# Patient Record
Sex: FEMALE | Race: BLACK OR AFRICAN AMERICAN | NOT HISPANIC OR LATINO | URBAN - METROPOLITAN AREA
[De-identification: names, ages, dates, MRNs, and addresses within clinical notes are randomized per-mention and may not be internally consistent; named-entity substitution may affect disease eponyms.]

---

## 2019-04-13 ENCOUNTER — EMERGENCY (EMERGENCY)
Facility: HOSPITAL | Age: 34
LOS: 1 days | Discharge: HOME | End: 2019-04-13
Attending: EMERGENCY MEDICINE | Admitting: EMERGENCY MEDICINE
Payer: MEDICAID

## 2019-04-13 VITALS
HEART RATE: 100 BPM | RESPIRATION RATE: 18 BRPM | DIASTOLIC BLOOD PRESSURE: 66 MMHG | TEMPERATURE: 99 F | SYSTOLIC BLOOD PRESSURE: 111 MMHG | OXYGEN SATURATION: 100 %

## 2019-04-13 LAB
ACANTHOCYTES BLD QL SMEAR: SLIGHT — SIGNIFICANT CHANGE UP
ALBUMIN SERPL ELPH-MCNC: 5.1 G/DL — HIGH (ref 3.3–5)
ALP SERPL-CCNC: 60 U/L — SIGNIFICANT CHANGE UP (ref 40–120)
ALT FLD-CCNC: 44 U/L — HIGH (ref 4–33)
ANION GAP SERPL CALC-SCNC: 15 MMO/L — HIGH (ref 7–14)
ANISOCYTOSIS BLD QL: SIGNIFICANT CHANGE UP
AST SERPL-CCNC: 128 U/L — HIGH (ref 4–32)
BASE EXCESS BLDV CALC-SCNC: 1.4 MMOL/L — SIGNIFICANT CHANGE UP
BASOPHILS # BLD AUTO: 0.01 K/UL — SIGNIFICANT CHANGE UP (ref 0–0.2)
BASOPHILS NFR BLD AUTO: 0.3 % — SIGNIFICANT CHANGE UP (ref 0–2)
BASOPHILS NFR SPEC: 0 % — SIGNIFICANT CHANGE UP (ref 0–2)
BILIRUB SERPL-MCNC: 1.4 MG/DL — HIGH (ref 0.2–1.2)
BLASTS # FLD: 0 % — SIGNIFICANT CHANGE UP (ref 0–0)
BLD GP AB SCN SERPL QL: NEGATIVE — SIGNIFICANT CHANGE UP
BLOOD GAS VENOUS - CREATININE: 0.68 MG/DL — SIGNIFICANT CHANGE UP (ref 0.5–1.3)
BUN SERPL-MCNC: 9 MG/DL — SIGNIFICANT CHANGE UP (ref 7–23)
CALCIUM SERPL-MCNC: 9.6 MG/DL — SIGNIFICANT CHANGE UP (ref 8.4–10.5)
CHLORIDE BLDV-SCNC: 100 MMOL/L — SIGNIFICANT CHANGE UP (ref 96–108)
CHLORIDE SERPL-SCNC: 99 MMOL/L — SIGNIFICANT CHANGE UP (ref 98–107)
CO2 SERPL-SCNC: 22 MMOL/L — SIGNIFICANT CHANGE UP (ref 22–31)
CREAT SERPL-MCNC: 0.66 MG/DL — SIGNIFICANT CHANGE UP (ref 0.5–1.3)
DACRYOCYTES BLD QL SMEAR: SLIGHT — SIGNIFICANT CHANGE UP
ELLIPTOCYTES BLD QL SMEAR: SLIGHT — SIGNIFICANT CHANGE UP
EOSINOPHIL # BLD AUTO: 0.07 K/UL — SIGNIFICANT CHANGE UP (ref 0–0.5)
EOSINOPHIL NFR BLD AUTO: 1.9 % — SIGNIFICANT CHANGE UP (ref 0–6)
EOSINOPHIL NFR FLD: 0.9 % — SIGNIFICANT CHANGE UP (ref 0–6)
GAS PNL BLDV: 138 MMOL/L — SIGNIFICANT CHANGE UP (ref 136–146)
GIANT PLATELETS BLD QL SMEAR: PRESENT — SIGNIFICANT CHANGE UP
GLUCOSE BLDV-MCNC: 97 — SIGNIFICANT CHANGE UP (ref 70–99)
GLUCOSE SERPL-MCNC: 102 MG/DL — HIGH (ref 70–99)
HCG SERPL-ACNC: < 5 MIU/ML — SIGNIFICANT CHANGE UP
HCO3 BLDV-SCNC: 25 MMOL/L — SIGNIFICANT CHANGE UP (ref 20–27)
HCT VFR BLD CALC: 15.6 % — CRITICAL LOW (ref 34.5–45)
HCT VFR BLDV CALC: 16.7 % — CRITICAL LOW (ref 34.5–45)
HGB BLD-MCNC: 5.2 G/DL — CRITICAL LOW (ref 11.5–15.5)
HGB BLDV-MCNC: 5.3 G/DL — CRITICAL LOW (ref 11.5–15.5)
HYPOCHROMIA BLD QL: SLIGHT — SIGNIFICANT CHANGE UP
IMM GRANULOCYTES NFR BLD AUTO: 2.5 % — HIGH (ref 0–1.5)
LACTATE BLDV-MCNC: 1 MMOL/L — SIGNIFICANT CHANGE UP (ref 0.5–2)
LYMPHOCYTES # BLD AUTO: 1.65 K/UL — SIGNIFICANT CHANGE UP (ref 1–3.3)
LYMPHOCYTES # BLD AUTO: 45.3 % — HIGH (ref 13–44)
LYMPHOCYTES NFR SPEC AUTO: 53.5 % — HIGH (ref 13–44)
MACROCYTES BLD QL: SLIGHT — SIGNIFICANT CHANGE UP
MCHC RBC-ENTMCNC: 30.8 PG — SIGNIFICANT CHANGE UP (ref 27–34)
MCHC RBC-ENTMCNC: 33.3 % — SIGNIFICANT CHANGE UP (ref 32–36)
MCV RBC AUTO: 92.3 FL — SIGNIFICANT CHANGE UP (ref 80–100)
METAMYELOCYTES # FLD: 0 % — SIGNIFICANT CHANGE UP (ref 0–1)
MICROCYTES BLD QL: SLIGHT — SIGNIFICANT CHANGE UP
MONOCYTES # BLD AUTO: 0.26 K/UL — SIGNIFICANT CHANGE UP (ref 0–0.9)
MONOCYTES NFR BLD AUTO: 7.1 % — SIGNIFICANT CHANGE UP (ref 2–14)
MONOCYTES NFR BLD: 1.7 % — LOW (ref 2–9)
MYELOCYTES NFR BLD: 1.8 % — HIGH (ref 0–0)
NEUTROPHIL AB SER-ACNC: 39.5 % — LOW (ref 43–77)
NEUTROPHILS # BLD AUTO: 1.56 K/UL — LOW (ref 1.8–7.4)
NEUTROPHILS NFR BLD AUTO: 42.9 % — LOW (ref 43–77)
NEUTS BAND # BLD: 0 % — SIGNIFICANT CHANGE UP (ref 0–6)
NRBC # BLD: 1 /100WBC — SIGNIFICANT CHANGE UP
NRBC # FLD: 0.13 K/UL — SIGNIFICANT CHANGE UP (ref 0–0)
NRBC FLD-RTO: 3.6 — SIGNIFICANT CHANGE UP
OB PNL STL: NEGATIVE — SIGNIFICANT CHANGE UP
OTHER - HEMATOLOGY %: 0 — SIGNIFICANT CHANGE UP
OVALOCYTES BLD QL SMEAR: SIGNIFICANT CHANGE UP
PCO2 BLDV: 41 MMHG — SIGNIFICANT CHANGE UP (ref 41–51)
PH BLDV: 7.41 PH — SIGNIFICANT CHANGE UP (ref 7.32–7.43)
PLATELET # BLD AUTO: 99 K/UL — LOW (ref 150–400)
PLATELET COUNT - ESTIMATE: SIGNIFICANT CHANGE UP
PMV BLD: SIGNIFICANT CHANGE UP FL (ref 7–13)
PO2 BLDV: 24 MMHG — LOW (ref 35–40)
POIKILOCYTOSIS BLD QL AUTO: SIGNIFICANT CHANGE UP
POLYCHROMASIA BLD QL SMEAR: SLIGHT — SIGNIFICANT CHANGE UP
POTASSIUM BLDV-SCNC: 3.5 MMOL/L — SIGNIFICANT CHANGE UP (ref 3.4–4.5)
POTASSIUM SERPL-MCNC: 3.7 MMOL/L — SIGNIFICANT CHANGE UP (ref 3.5–5.3)
POTASSIUM SERPL-SCNC: 3.7 MMOL/L — SIGNIFICANT CHANGE UP (ref 3.5–5.3)
PROMYELOCYTES # FLD: 0 % — SIGNIFICANT CHANGE UP (ref 0–0)
PROT SERPL-MCNC: 7.5 G/DL — SIGNIFICANT CHANGE UP (ref 6–8.3)
RBC # BLD: 1.69 M/UL — LOW (ref 3.8–5.2)
RBC # FLD: 25 % — HIGH (ref 10.3–14.5)
RH IG SCN BLD-IMP: POSITIVE — SIGNIFICANT CHANGE UP
RH IG SCN BLD-IMP: POSITIVE — SIGNIFICANT CHANGE UP
SAO2 % BLDV: 27.8 % — LOW (ref 60–85)
SCHISTOCYTES BLD QL AUTO: SLIGHT — SIGNIFICANT CHANGE UP
SODIUM SERPL-SCNC: 136 MMOL/L — SIGNIFICANT CHANGE UP (ref 135–145)
TSH SERPL-MCNC: 1.24 UIU/ML — SIGNIFICANT CHANGE UP (ref 0.27–4.2)
VARIANT LYMPHS # BLD: 2.6 % — SIGNIFICANT CHANGE UP
WBC # BLD: 3.64 K/UL — LOW (ref 3.8–10.5)
WBC # FLD AUTO: 3.64 K/UL — LOW (ref 3.8–10.5)

## 2019-04-13 PROCEDURE — 93010 ELECTROCARDIOGRAM REPORT: CPT | Mod: 59

## 2019-04-13 PROCEDURE — 71046 X-RAY EXAM CHEST 2 VIEWS: CPT | Mod: 26

## 2019-04-13 PROCEDURE — 99218: CPT

## 2019-04-13 RX ORDER — SODIUM CHLORIDE 9 MG/ML
1000 INJECTION INTRAMUSCULAR; INTRAVENOUS; SUBCUTANEOUS ONCE
Qty: 0 | Refills: 0 | Status: COMPLETED | OUTPATIENT
Start: 2019-04-13 | End: 2019-04-14

## 2019-04-13 NOTE — ED PROVIDER NOTE - CLINICAL SUMMARY MEDICAL DECISION MAKING FREE TEXT BOX
33F with dyspnea on exertion and palpitations, r/o symptomatic anemia, thyrotoxicosis, arrhythmia. check labs, tsh

## 2019-04-13 NOTE — ED CDU PROVIDER INITIAL DAY NOTE - ATTENDING CONTRIBUTION TO CARE
I performed a history and physical exam of the patient and discussed their management with the advanced care provider. I reviewed the advanced care provider's note and agree with the documented findings and plan of care. 32 y/o female with hx of anemia p/w GREENE, weakness, and palpitations worse this week, pt visiting from [facility] no CP, no fever, no abdominal pain, no BRBPR, no melana, no dysmenorrhea, found to have low h/h, needs to be admitted to CDU for further monitoring, transfusion,

## 2019-04-13 NOTE — ED ADULT NURSE NOTE - OBJECTIVE STATEMENT
33 y female A+OX3 presents to the ER with the complaint of palpitations. Pt is visiting from [facility] and states that for the past few days, she has been experiencing intermittent palpitations. Denies any CP or sob. Pt states she has a hx of anemia and hyperthyroidism. States she has not been on her thyroid meds for 2 months as she was told her levels were normal and patient states she has never needed a blood transfusion in the past. Pt denies any dizziness or nausea. Pt appears pale in complexion but unknown if this is the patient's regular color. 20 g iv placed on right ac, labs drawn and sent. Will continue to monitor.

## 2019-04-13 NOTE — ED CDU PROVIDER INITIAL DAY NOTE - OBJECTIVE STATEMENT
Pt is 34 y/o female, Creole speaking (translation services offered but pt declined and wished that family translates) with Pmhx of anemia and hyperthyroidism here for eval of generalized weakness, sob and palpations x 4 days. Pt admits that she has been feeling weak  and tired "for months", has been off her medications for "quite a while", started on iron for possible TAMAR by PMD who is working pt up for anemia in [facility]. Pt denies any CP, syncopal episodes, denies dizziness, denies heavy vaginal bleeds, melena (guaiac negative), states that her Hb was around 8 last time it was checked but pt doesn't recall when was it. LMP - 4/1, ucg negative. In cdu for 2 units of PRBCs

## 2019-04-13 NOTE — ED ADULT TRIAGE NOTE - CHIEF COMPLAINT QUOTE
Pt c/o SOB and palpitations x 3 days, along with dizziness and weakness, appears pale and lethargic. Pt with PMH of hyperthyroid and anemia (not on iron supplements, no hx of blood transfusions), denies recent bleeding.

## 2019-04-13 NOTE — ED ADULT NURSE NOTE - NSIMPLEMENTINTERV_GEN_ALL_ED
Implemented All Universal Safety Interventions:  Advance to call system. Call bell, personal items and telephone within reach. Instruct patient to call for assistance. Room bathroom lighting operational. Non-slip footwear when patient is off stretcher. Physically safe environment: no spills, clutter or unnecessary equipment. Stretcher in lowest position, wheels locked, appropriate side rails in place.

## 2019-04-13 NOTE — ED PROVIDER NOTE - ATTENDING CONTRIBUTION TO CARE
I performed a face to face history and physical exam of the patient and discussed their management with the resident. I reviewed the resident's note and agree with the documented findings and plan of care. 34 y/o female with hx of anemia p/w GREENE, weakness, and palpitations worse this week, pt visiting from [facility] no CP, no fever, no abdominal pain, no BRBPR, no melana, no dysmenorrhea, 1)tele 2)EKG 3)CBC, CMP, VBG, CE, TSH, 3)Reevaluate

## 2019-04-13 NOTE — ED PROVIDER NOTE - OBJECTIVE STATEMENT
33F with hx of anemia and hyperthyroidism (off medications for a few months, visiting from [facility] for 6 more weeks) p/w palpitations and GREENE for the past 3 days. patient states palpitations with exertion only, comfortable at rest. normal menses, lmp 4/1, no dark/black/bloody stools, heavy periods, chest pain. does follow with an anemia clinic in [facility], never transfused, no known etiology of anemia.

## 2019-04-14 VITALS
DIASTOLIC BLOOD PRESSURE: 70 MMHG | TEMPERATURE: 98 F | OXYGEN SATURATION: 100 % | SYSTOLIC BLOOD PRESSURE: 114 MMHG | RESPIRATION RATE: 16 BRPM | HEART RATE: 66 BPM

## 2019-04-14 LAB
APPEARANCE UR: CLEAR — SIGNIFICANT CHANGE UP
BACTERIA # UR AUTO: SIGNIFICANT CHANGE UP
BASOPHILS # BLD AUTO: 0.01 K/UL — SIGNIFICANT CHANGE UP (ref 0–0.2)
BASOPHILS NFR BLD AUTO: 0.3 % — SIGNIFICANT CHANGE UP (ref 0–2)
BILIRUB UR-MCNC: NEGATIVE — SIGNIFICANT CHANGE UP
BLOOD UR QL VISUAL: SIGNIFICANT CHANGE UP
COLOR SPEC: YELLOW — SIGNIFICANT CHANGE UP
EOSINOPHIL # BLD AUTO: 0.08 K/UL — SIGNIFICANT CHANGE UP (ref 0–0.5)
EOSINOPHIL NFR BLD AUTO: 2.5 % — SIGNIFICANT CHANGE UP (ref 0–6)
GLUCOSE UR-MCNC: NEGATIVE — SIGNIFICANT CHANGE UP
HCT VFR BLD CALC: 23.5 % — LOW (ref 34.5–45)
HGB BLD-MCNC: 7.9 G/DL — LOW (ref 11.5–15.5)
HYALINE CASTS # UR AUTO: NEGATIVE — SIGNIFICANT CHANGE UP
IMM GRANULOCYTES NFR BLD AUTO: 1.9 % — HIGH (ref 0–1.5)
KETONES UR-MCNC: NEGATIVE — SIGNIFICANT CHANGE UP
LEUKOCYTE ESTERASE UR-ACNC: NEGATIVE — SIGNIFICANT CHANGE UP
LYMPHOCYTES # BLD AUTO: 1.66 K/UL — SIGNIFICANT CHANGE UP (ref 1–3.3)
LYMPHOCYTES # BLD AUTO: 51.6 % — HIGH (ref 13–44)
MANUAL SMEAR VERIFICATION: SIGNIFICANT CHANGE UP
MCHC RBC-ENTMCNC: 28.7 PG — SIGNIFICANT CHANGE UP (ref 27–34)
MCHC RBC-ENTMCNC: 33.6 % — SIGNIFICANT CHANGE UP (ref 32–36)
MCV RBC AUTO: 85.5 FL — SIGNIFICANT CHANGE UP (ref 80–100)
MONOCYTES # BLD AUTO: 0.16 K/UL — SIGNIFICANT CHANGE UP (ref 0–0.9)
MONOCYTES NFR BLD AUTO: 5 % — SIGNIFICANT CHANGE UP (ref 2–14)
NEUTROPHILS # BLD AUTO: 1.25 K/UL — LOW (ref 1.8–7.4)
NEUTROPHILS NFR BLD AUTO: 38.7 % — LOW (ref 43–77)
NITRITE UR-MCNC: NEGATIVE — SIGNIFICANT CHANGE UP
NRBC # FLD: 0.14 K/UL — SIGNIFICANT CHANGE UP (ref 0–0)
NRBC FLD-RTO: 4.3 — SIGNIFICANT CHANGE UP
PH UR: 7.5 — SIGNIFICANT CHANGE UP (ref 5–8)
PLATELET # BLD AUTO: 74 K/UL — LOW (ref 150–400)
PMV BLD: SIGNIFICANT CHANGE UP FL (ref 7–13)
PROT UR-MCNC: 20 — SIGNIFICANT CHANGE UP
RBC # BLD: 2.75 M/UL — LOW (ref 3.8–5.2)
RBC # FLD: 21.3 % — HIGH (ref 10.3–14.5)
RBC CASTS # UR COMP ASSIST: SIGNIFICANT CHANGE UP (ref 0–?)
SP GR SPEC: 1.02 — SIGNIFICANT CHANGE UP (ref 1–1.04)
SQUAMOUS # UR AUTO: SIGNIFICANT CHANGE UP
UROBILINOGEN FLD QL: HIGH
WBC # BLD: 3.22 K/UL — LOW (ref 3.8–10.5)
WBC # FLD AUTO: 3.22 K/UL — LOW (ref 3.8–10.5)
WBC UR QL: SIGNIFICANT CHANGE UP (ref 0–?)

## 2019-04-14 PROCEDURE — 99217: CPT

## 2019-04-14 RX ORDER — FLUCONAZOLE 150 MG/1
1 TABLET ORAL
Qty: 1 | Refills: 0 | OUTPATIENT
Start: 2019-04-14 | End: 2019-04-14

## 2019-04-14 RX ADMIN — SODIUM CHLORIDE 1000 MILLILITER(S): 9 INJECTION INTRAMUSCULAR; INTRAVENOUS; SUBCUTANEOUS at 07:34

## 2019-04-14 NOTE — ED CDU PROVIDER SUBSEQUENT DAY NOTE - PROGRESS NOTE DETAILS
Sign out report received from overnight team, pt received 2 units, states she is feeling much better, hgb now 7.9 from 5.2, appropriate rise in hgb, pt has been advised to follow up with a Hematologist and to restart taking iron supplements, referral list for hematology given, pt is stable to be discharged from ED. Sign out report received from overnight team, pt received 2 units, states she is feeling much better, hgb now 7.9 from 5.2, appropriate rise in hgb, pt has been advised to follow up with a Hematologist and to restart taking iron supplements, referral list for hematology given, pt is stable to be discharged from ED. Of note, pt complained of vaginal itching and some whitish discharge, no pelvic pain, fevers, chills or dysuria, will send rx for Diflucan 150mgs PO x 1.

## 2019-04-14 NOTE — ED CDU PROVIDER DISPOSITION NOTE - CLINICAL COURSE
admitted to cdu for symptomatic anemia. tx'd 2 units with good response. diflucan for yeast infection.  dc with pmd/gyn f/u

## 2019-04-14 NOTE — ED CDU PROVIDER SUBSEQUENT DAY NOTE - HISTORY
Pt is 32 y/o female with Pmhx of anemia and hyperthyroid here for eval of generalized weakness, palpitations and SOB, noted to have Hb of 5.2 (on most recent CBC Hb was 8); no melena (guaiac neg), no heavy vag bleeding, pt's pcp in [facility] is working pt up for anemia. in cdu for 2 units of PRBS.

## 2019-04-14 NOTE — ED CDU PROVIDER SUBSEQUENT DAY NOTE - ATTENDING CONTRIBUTION TO CARE
symptomatic anemia, currently on 2nd unit of PRBC, noted to be hypotensive, but repeat 102/59. 2nd unit already in CDU - will run 2nd unit and repeat CBC immediately afterwards and give pt fluids;

## 2019-04-14 NOTE — ED CDU PROVIDER DISPOSITION NOTE - NSFOLLOWUPCLINICS_GEN_ALL_ED_FT
[facility]  Hematology/Oncology  [address] [id number]  Phone: [phone]  Fax:   Follow Up Time:

## 2019-04-15 LAB — SPECIMEN SOURCE: SIGNIFICANT CHANGE UP

## 2019-04-16 LAB
-  AMIKACIN: SIGNIFICANT CHANGE UP
-  AMPICILLIN/SULBACTAM: SIGNIFICANT CHANGE UP
-  AMPICILLIN: SIGNIFICANT CHANGE UP
-  AZTREONAM: SIGNIFICANT CHANGE UP
-  CEFAZOLIN: SIGNIFICANT CHANGE UP
-  CEFEPIME: SIGNIFICANT CHANGE UP
-  CEFOXITIN: SIGNIFICANT CHANGE UP
-  CEFTAZIDIME: SIGNIFICANT CHANGE UP
-  CEFTRIAXONE: SIGNIFICANT CHANGE UP
-  CIPROFLOXACIN: SIGNIFICANT CHANGE UP
-  ERTAPENEM: SIGNIFICANT CHANGE UP
-  GENTAMICIN: SIGNIFICANT CHANGE UP
-  IMIPENEM: SIGNIFICANT CHANGE UP
-  LEVOFLOXACIN: SIGNIFICANT CHANGE UP
-  MEROPENEM: SIGNIFICANT CHANGE UP
-  NITROFURANTOIN: SIGNIFICANT CHANGE UP
-  PIPERACILLIN/TAZOBACTAM: SIGNIFICANT CHANGE UP
-  TIGECYCLINE: SIGNIFICANT CHANGE UP
-  TOBRAMYCIN: SIGNIFICANT CHANGE UP
-  TRIMETHOPRIM/SULFAMETHOXAZOLE: SIGNIFICANT CHANGE UP
BACTERIA UR CULT: SIGNIFICANT CHANGE UP
METHOD TYPE: SIGNIFICANT CHANGE UP
ORGANISM # SPEC MICROSCOPIC CNT: SIGNIFICANT CHANGE UP
ORGANISM # SPEC MICROSCOPIC CNT: SIGNIFICANT CHANGE UP

## 2019-04-16 RX ORDER — CEFPODOXIME PROXETIL 100 MG
1 TABLET ORAL
Qty: 20 | Refills: 0 | OUTPATIENT
Start: 2019-04-16 | End: 2019-04-25

## 2019-04-16 NOTE — ED POST DISCHARGE NOTE - RESULT SUMMARY
UCX : E. Coli > 100,000 prelim no S/R profile as of yet. No antibiotic listed in ED provider note or prescription writer at time of discharge. Patient contact # [phone] Msg left with call back PA # and hrs alt # [phone].

## 2019-04-16 NOTE — ED POST DISCHARGE NOTE - ADDITIONAL DOCUMENTATION
HUGH Juarez, @ 3:37pm, Patient returned call, I sent 10 day rx for Vantin to her preferred pharmacy.